# Patient Record
Sex: FEMALE | Race: WHITE | NOT HISPANIC OR LATINO | Employment: OTHER | URBAN - METROPOLITAN AREA
[De-identification: names, ages, dates, MRNs, and addresses within clinical notes are randomized per-mention and may not be internally consistent; named-entity substitution may affect disease eponyms.]

---

## 2019-07-28 ENCOUNTER — APPOINTMENT (OUTPATIENT)
Dept: ULTRASOUND IMAGING | Facility: CLINIC | Age: 54
End: 2019-07-28
Attending: EMERGENCY MEDICINE
Payer: COMMERCIAL

## 2019-07-28 ENCOUNTER — APPOINTMENT (OUTPATIENT)
Dept: CT IMAGING | Facility: CLINIC | Age: 54
End: 2019-07-28
Attending: EMERGENCY MEDICINE
Payer: COMMERCIAL

## 2019-07-28 ENCOUNTER — HOSPITAL ENCOUNTER (EMERGENCY)
Facility: CLINIC | Age: 54
Discharge: HOME | End: 2019-07-28
Attending: EMERGENCY MEDICINE | Admitting: EMERGENCY MEDICINE
Payer: COMMERCIAL

## 2019-07-28 VITALS
OXYGEN SATURATION: 99 % | RESPIRATION RATE: 16 BRPM | SYSTOLIC BLOOD PRESSURE: 111 MMHG | TEMPERATURE: 97.9 F | HEIGHT: 65 IN | HEART RATE: 55 BPM | WEIGHT: 157 LBS | DIASTOLIC BLOOD PRESSURE: 73 MMHG | BODY MASS INDEX: 26.16 KG/M2

## 2019-07-28 DIAGNOSIS — R10.9 RIGHT SIDED ABDOMINAL PAIN: ICD-10-CM

## 2019-07-28 DIAGNOSIS — Z87.42 HISTORY OF ENDOMETRIOSIS: ICD-10-CM

## 2019-07-28 LAB
ALBUMIN SERPL-MCNC: 3.6 G/DL (ref 3.4–5)
ALBUMIN UR-MCNC: NEGATIVE MG/DL
ALP SERPL-CCNC: 64 U/L (ref 40–150)
ALT SERPL W P-5'-P-CCNC: 17 U/L (ref 0–50)
ANION GAP SERPL CALCULATED.3IONS-SCNC: 4 MMOL/L (ref 3–14)
APPEARANCE UR: CLEAR
AST SERPL W P-5'-P-CCNC: 18 U/L (ref 0–45)
BACTERIA #/AREA URNS HPF: ABNORMAL /HPF
BASOPHILS # BLD AUTO: 0 10E9/L (ref 0–0.2)
BASOPHILS NFR BLD AUTO: 0.5 %
BILIRUB SERPL-MCNC: 0.5 MG/DL (ref 0.2–1.3)
BILIRUB UR QL STRIP: NEGATIVE
BUN SERPL-MCNC: 15 MG/DL (ref 7–30)
CALCIUM SERPL-MCNC: 8.8 MG/DL (ref 8.5–10.1)
CHLORIDE SERPL-SCNC: 108 MMOL/L (ref 94–109)
CO2 SERPL-SCNC: 28 MMOL/L (ref 20–32)
COLOR UR AUTO: ABNORMAL
CREAT SERPL-MCNC: 0.78 MG/DL (ref 0.52–1.04)
DIFFERENTIAL METHOD BLD: NORMAL
EOSINOPHIL # BLD AUTO: 0.1 10E9/L (ref 0–0.7)
EOSINOPHIL NFR BLD AUTO: 2.1 %
ERYTHROCYTE [DISTWIDTH] IN BLOOD BY AUTOMATED COUNT: 12.7 % (ref 10–15)
GFR SERPL CREATININE-BSD FRML MDRD: 87 ML/MIN/{1.73_M2}
GLUCOSE SERPL-MCNC: 86 MG/DL (ref 70–99)
GLUCOSE UR STRIP-MCNC: NEGATIVE MG/DL
HCT VFR BLD AUTO: 38.1 % (ref 35–47)
HGB BLD-MCNC: 13.1 G/DL (ref 11.7–15.7)
HGB UR QL STRIP: NEGATIVE
IMM GRANULOCYTES # BLD: 0 10E9/L (ref 0–0.4)
IMM GRANULOCYTES NFR BLD: 0 %
KETONES UR STRIP-MCNC: NEGATIVE MG/DL
LEUKOCYTE ESTERASE UR QL STRIP: NEGATIVE
LIPASE SERPL-CCNC: 150 U/L (ref 73–393)
LYMPHOCYTES # BLD AUTO: 1.5 10E9/L (ref 0.8–5.3)
LYMPHOCYTES NFR BLD AUTO: 36.4 %
MCH RBC QN AUTO: 29.5 PG (ref 26.5–33)
MCHC RBC AUTO-ENTMCNC: 34.4 G/DL (ref 31.5–36.5)
MCV RBC AUTO: 86 FL (ref 78–100)
MONOCYTES # BLD AUTO: 0.3 10E9/L (ref 0–1.3)
MONOCYTES NFR BLD AUTO: 6.2 %
MUCOUS THREADS #/AREA URNS LPF: PRESENT /LPF
NEUTROPHILS # BLD AUTO: 2.3 10E9/L (ref 1.6–8.3)
NEUTROPHILS NFR BLD AUTO: 54.8 %
NITRATE UR QL: NEGATIVE
NRBC # BLD AUTO: 0 10*3/UL
NRBC BLD AUTO-RTO: 0 /100
PH UR STRIP: 6.5 PH (ref 5–7)
PLATELET # BLD AUTO: 203 10E9/L (ref 150–450)
POTASSIUM SERPL-SCNC: 3.7 MMOL/L (ref 3.4–5.3)
PROT SERPL-MCNC: 7 G/DL (ref 6.8–8.8)
RBC # BLD AUTO: 4.44 10E12/L (ref 3.8–5.2)
RBC #/AREA URNS AUTO: <1 /HPF (ref 0–2)
SODIUM SERPL-SCNC: 140 MMOL/L (ref 133–144)
SOURCE: ABNORMAL
SP GR UR STRIP: 1 (ref 1–1.03)
SQUAMOUS #/AREA URNS AUTO: <1 /HPF (ref 0–1)
UROBILINOGEN UR STRIP-MCNC: NORMAL MG/DL (ref 0–2)
WBC # BLD AUTO: 4.2 10E9/L (ref 4–11)
WBC #/AREA URNS AUTO: 0 /HPF (ref 0–5)

## 2019-07-28 PROCEDURE — 25000128 H RX IP 250 OP 636: Performed by: EMERGENCY MEDICINE

## 2019-07-28 PROCEDURE — 76705 ECHO EXAM OF ABDOMEN: CPT

## 2019-07-28 PROCEDURE — 83690 ASSAY OF LIPASE: CPT | Performed by: EMERGENCY MEDICINE

## 2019-07-28 PROCEDURE — 25000125 ZZHC RX 250: Performed by: EMERGENCY MEDICINE

## 2019-07-28 PROCEDURE — 99285 EMERGENCY DEPT VISIT HI MDM: CPT | Mod: 25

## 2019-07-28 PROCEDURE — 96374 THER/PROPH/DIAG INJ IV PUSH: CPT

## 2019-07-28 PROCEDURE — 25500064 ZZH RX 255 OP 636: Performed by: EMERGENCY MEDICINE

## 2019-07-28 PROCEDURE — 85025 COMPLETE CBC W/AUTO DIFF WBC: CPT | Performed by: EMERGENCY MEDICINE

## 2019-07-28 PROCEDURE — 74177 CT ABD & PELVIS W/CONTRAST: CPT

## 2019-07-28 PROCEDURE — 80053 COMPREHEN METABOLIC PANEL: CPT | Performed by: EMERGENCY MEDICINE

## 2019-07-28 PROCEDURE — 81001 URINALYSIS AUTO W/SCOPE: CPT | Performed by: EMERGENCY MEDICINE

## 2019-07-28 RX ORDER — HYDROMORPHONE HYDROCHLORIDE 1 MG/ML
0.5 INJECTION, SOLUTION INTRAMUSCULAR; INTRAVENOUS; SUBCUTANEOUS
Status: DISCONTINUED | OUTPATIENT
Start: 2019-07-28 | End: 2019-07-28 | Stop reason: HOSPADM

## 2019-07-28 RX ADMIN — SODIUM CHLORIDE 64 ML: 9 INJECTION, SOLUTION INTRAVENOUS at 09:26

## 2019-07-28 RX ADMIN — HYDROMORPHONE HYDROCHLORIDE 0.5 MG: 1 INJECTION, SOLUTION INTRAMUSCULAR; INTRAVENOUS; SUBCUTANEOUS at 09:11

## 2019-07-28 RX ADMIN — IOHEXOL 79 ML: 350 INJECTION, SOLUTION INTRAVENOUS at 09:25

## 2019-07-28 ASSESSMENT — MIFFLIN-ST. JEOR: SCORE: 1323.03

## 2019-07-28 NOTE — ED TRIAGE NOTES
Patient reports right upper abdominal pain for 3 days. States first day had severe, lower right sided back pain. Complains of nausea and dizziness. Denies N/V/D and fevers. Patient states intermittent severity of pain.

## 2019-07-28 NOTE — ED AVS SNAPSHOT
Emergency Department  [address] [place] [id number]  Phone:  [phone]  Fax:  [phone]                                    Charmaine Webb   MRN: [id number]    Department:   Emergency Department   Date of Visit:  7/28/2019           After Visit Summary Signature Page    I have received my discharge instructions, and my questions have been answered. I have discussed any challenges I see with this plan with the nurse or doctor.    ..........................................................................................................................................  Patient/Patient Representative Signature      ..........................................................................................................................................  Patient Representative Print Name and Relationship to Patient    ..................................................               ................................................  Date                                   Time    ..........................................................................................................................................  Reviewed by Signature/Title    ...................................................              ..............................................  Date                                               Time          22EPIC Rev 08/18

## 2019-07-28 NOTE — ED PROVIDER NOTES
"  History   Chief Complaint:  Abdominal Pain    HPI   Charmaine Webb is a 54 year old female  with a history of chronic abdominal pain attributed to endometriosis proven by exploratory surgery presents emergency department with a new and different type of right-sided abdominal pain that started 3 to 4 days ago.  It is often provoked by eating, starting about 10 minutes after eating and she describes as a sharp \"spasm\" primarily in the right upper part of her abdomen, though it radiates downward toward the right lower quadrant.  She also has a more constant and new right lower quadrant discomfort.  No urinary symptoms.  She is previous he had a hysterectomy as well as a right-sided oophorectomy for torsion and advanced endometriosis.  No fevers, vomiting, or diarrhea.  No abdominal trauma.  She is not on blood thinners.  She has never been told she had gallbladder problems.  She is here because she is specifically worried about the possibility of gallbladder or appendix problems.    Allergies:  Codeine    Medications:    The patient is not currently taking any prescribed medications.    Past Medical History:    Seasonal Allergies  Hernia    Past Surgical History:     section  Hysterectomy  Hernia repair    Family History:    History reviewed. No pertinent family history.     Social History:  Smoking status: No  Alcohol use: Yes  Drug use: No    Review of Systems   All other systems reviewed and are negative.      Physical Exam     Patient Vitals for the past 24 hrs:   BP Temp Temp src Pulse Resp SpO2 Height Weight   19 0940 106/71 -- -- 56 -- -- -- --   19 0910 106/74 -- -- 58 -- -- -- --   19 0727 127/55 97.9  F (36.6  C) Temporal 67 16 98 % 1.651 m (5' 5\") 71.2 kg (157 lb)     Physical Exam  General: Nontoxic-appearing woman sitting upright in room 19,  at bedside  HENT: mucous membranes moist   CV: regular rate, regular rhythm, no LE edema  Resp: clear " throughout, normal effort, no crackles or wheezing  GI: abdomen soft, moderate tenderness in both right upper and right lower quadrants without guarding, negative Grijalva's, no discrete masses palpable  MSK: no bony tenderness, no CVAT  Skin: appropriately warm and dry, no abdominal rash  Neuro: alert, clear speech, oriented   Psych: pleasant, cooperative    Emergency Department Course   Imaging:  Radiographic findings were communicated with the patient who voiced understanding of the findings.    US Abdomen limited:  Unremarkable right upper quadrant ultrasound.    Imaging independently reviewed and agree with radiologist interpretation.      CT Abd/pelvis with contrast:  1. Moderate formed stool identified in the colon without obstruction  or inflammatory changes.  2. No solid organ abnormalities.    Imaging independently reviewed and agree with radiologist interpretation.     Laboratory:  CBC: WNL (WBC 4.2, HGB 13.1, )    CMP: (Creatinine 0.78)    Lipase: 150    UA: Bacteria Few, Mucous Present, o/w Negative    Interventions:  0911: Dilaudid 0.5 mg IV    Emergency Department Course:  Past medical records, nursing notes, and vitals reviewed.  0745: I performed an exam of the patient and obtained history, as documented above.  IV inserted and blood drawn.    I performed electronic chart review in [employer].    The patient was sent for a abdomen US while in the emergency department, findings above.    0915: I rechecked the patient. Explained findings to patient. The patient agrees to have further imaging done.    1036: I rechecked the patient. Explained findings to patient. Patient is feeling improved. Sleeping soundly.    Findings and plan explained to the Patient. Patient discharged home with instructions regarding supportive care, medications, and reasons to return. The importance of close follow-up was reviewed.     Impression & Plan    Medical Decision Making:  Differential diagnosis included biliary colic,  hepatitis, pancreatitis, gastritis, appendicitis, ureteral stone, and many others.  She is a history of endometriosis causing some chronic abdominal problems, though has not had symptoms like this in the past.  But test results as well as her limitations were reviewed with the patient.  She and her  are very comfortable with the plan for discharge home with close outpatient follow-up.  We discussed possible constipation seen on imaging, though she states this is not been an issue as she had a bowel movement yesterday, and did not wish to initiate any bowel regimen.  Return precautions reviewed and close outpatient follow-up was advised.  She was discharged home in improved condition.    Diagnosis:    ICD-10-CM    1. Right sided abdominal pain R10.9    2. History of endometriosis Z87.42      Disposition:  Discharged to home.    Discharge Medications:     Medication List      Started    omeprazole 20 MG DR capsule  Commonly known as:  priLOSEC  20 mg, Oral, DAILY          Abdi Mari  7/28/2019    EMERGENCY DEPARTMENT  Scribe Disclosure:  IAbdi, am serving as a scribe at 7:45 AM on 7/28/2019 to document services personally performed by Michele Romero MD based on my observations and the provider's statements to me.    This record was created at least in part using electronic voice recognition software, so please excuse any typographical errors.       Michele Romero MD  07/28/19 8453

## 2020-12-09 ENCOUNTER — HOSPITAL ENCOUNTER (OUTPATIENT)
Dept: MAMMOGRAPHY | Facility: CLINIC | Age: 55
End: 2020-12-09
Attending: OBSTETRICS & GYNECOLOGY
Payer: COMMERCIAL

## 2020-12-09 DIAGNOSIS — N64.4 PAIN OF RIGHT BREAST: ICD-10-CM

## 2020-12-09 PROCEDURE — G0279 TOMOSYNTHESIS, MAMMO: HCPCS

## 2020-12-09 PROCEDURE — 76642 ULTRASOUND BREAST LIMITED: CPT | Mod: RT

## 2021-01-15 ENCOUNTER — HEALTH MAINTENANCE LETTER (OUTPATIENT)
Age: 56
End: 2021-01-15

## 2021-03-30 ENCOUNTER — VIRTUAL VISIT (OUTPATIENT)
Dept: FAMILY MEDICINE | Facility: CLINIC | Age: 56
End: 2021-03-30
Payer: COMMERCIAL

## 2021-03-30 DIAGNOSIS — Z12.11 ENCOUNTER FOR SCREENING FOR MALIGNANT NEOPLASM OF COLON: ICD-10-CM

## 2021-03-30 DIAGNOSIS — M70.61 TROCHANTERIC BURSITIS OF RIGHT HIP: ICD-10-CM

## 2021-03-30 DIAGNOSIS — Z90.710 HISTORY OF HYSTERECTOMY: ICD-10-CM

## 2021-03-30 DIAGNOSIS — K43.2 INCISIONAL HERNIA, WITHOUT OBSTRUCTION OR GANGRENE: ICD-10-CM

## 2021-03-30 DIAGNOSIS — J30.2 SEASONAL ALLERGIES: ICD-10-CM

## 2021-03-30 DIAGNOSIS — M19.029 ELBOW ARTHRITIS: ICD-10-CM

## 2021-03-30 DIAGNOSIS — N80.9 ENDOMETRIOSIS: ICD-10-CM

## 2021-03-30 DIAGNOSIS — M35.9 UNDIFFERENTIATED CONNECTIVE TISSUE DISEASE (H): Primary | ICD-10-CM

## 2021-03-30 DIAGNOSIS — M11.00: ICD-10-CM

## 2021-03-30 DIAGNOSIS — J45.20 MILD INTERMITTENT ASTHMA WITHOUT COMPLICATION: ICD-10-CM

## 2021-03-30 PROCEDURE — 99204 OFFICE O/P NEW MOD 45 MIN: CPT | Mod: TEL | Performed by: INTERNAL MEDICINE

## 2021-03-30 RX ORDER — HYDROXYCHLOROQUINE SULFATE 200 MG/1
TABLET, FILM COATED ORAL
COMMUNITY
Start: 2020-11-02 | End: 2021-03-30

## 2021-03-30 RX ORDER — ALBUTEROL SULFATE 90 UG/1
AEROSOL, METERED RESPIRATORY (INHALATION)
COMMUNITY
Start: 2020-11-01 | End: 2024-01-02

## 2021-03-30 RX ORDER — CONJUGATED ESTROGENS 0.62 MG/1
0.62 TABLET, FILM COATED ORAL DAILY
COMMUNITY
Start: 2021-03-04

## 2021-03-30 NOTE — PATIENT INSTRUCTIONS
As discussed regarding your medical conditions pertinent to Rheumatology and the work up done recently will get all the records for my review before further recommendations.      ===========================    [email or web address]?utm_source=[email or web address]&utm_medium=social_organic&utm_campaign=mhf_comms_vaccine_update_65_over&fbclid=[id number]       ===========================      We are working hard to begin vaccinating more people against COVID-19. Currently, we are vaccinating:    Individuals age 65 and older    Phase 1a healthcare workers, both paid and unpaid, who are unable to do their job remotely.     People 16 and older with rare conditions or disabilities that put them at higher risk listed in Phase 1b tier 2.    People age 45-64 years with one or more underlying medical conditions listed in Phase 1b tier 3.    People age 16 or 18-44 years with two or more underlying medical conditions listed in Phase 1b tier 3.    People age 50 years and older in multigenerational housing (three or more generations living in the household)     If you fit into one of these categories, please log in to [employer] using this link to see if we have an open appointment and schedule an appointment.  Most new appointments are released on Tuesdays at 8 a.m. This is when appointment availability is best. Additional appointments may open up during the week as appointments are canceled or we receive additional vaccine.    If there are no appointments left, you will be unable to schedule.   If you have technical difficulty using [employer], call [phone] for assistance.      You can learn more about the [facility]'s phased approach to administering the vaccine, with details on each phase,?Https://www.health.[email or web address].[email or web address]/diseases/coronavirus/vaccine/plan.     As vaccine supply increases and we are able to open appointments to more groups, we will share that  information on our website:  [email or web address]. Check this website to stay up to date on COVID-19 vaccination information.         ===========================    [phone] - Can try calling this number, does have long wait time.     [place]     [place]://[place]/vaccine/find-vaccine/index.jsp?fbclid=[id number]

## 2021-03-30 NOTE — PROGRESS NOTES
Charmaine is a 55 year old who is being evaluated via a billable telephone visit.      What phone number would you like to be contacted at? [phone]  [facility]    Assessment and Plan  1. Undifferentiated connective tissue disease (H)  2. Hydroxyapatite deposition disease  Ongoing medical condition, Started initially 2 yrs back. Not on Plaquinil as pt states that she never took it though in the medication list. Blood work demonstarted Lupus? - and working diagnosis of Undifferentiated Connective tissue disease. NELSON,RNP showed up POSITIVE.  Last lab work in 12/2020 and 3/12/2021.  She does take Benadryl and Ibuprofen for flare ups and follows [facility] for specific joint issues. Rheumatologist - [facility] [place]. Dr. Huong Wallis.  Saw him last on 3/12/2021 with all work up done as per the patient. Will get records after pt signs LASHAY with Inclinic visit.Further labs will be considered after I review the records.     3. Trochanteric bursitis of right hip  4. Elbow arthritis  Stable, Rt trochanteric bursitis and resolved after Cortisone shots.After 6 months Elbow joint was involved and following Orthopedics , [facility] .Will get records after pt signs LASHAY with Inclinic visit.     5. Mild intermittent asthma without complication  6. Seasonal allergies  Well controlled, rarely needing Albuterol PRN. AAP and ACT discussed.     7. Encounter for screening for malignant neoplasm of colon  - Fecal colorectal cancer screen (FIT); Future    8. Endometriosis  9. History of hysterectomy  10. Incisional hernia, without obstruction or gangrene  Pt had endometriosis S/P Hysterectomy and oophorectomy 2011 and 2014. Hernia mesh in rectus 2015. Left Ovary is adhered to Sigmoid colon and also endometriosis patches on most of the intestines and pt is hesitant for COlonoscopy . Does have on and off abdominal pains in the past but stable since 1 year. Will get the  records from OBGYN, once pt does LASHAY after Inclinic visit.        Patient Instructions   As discussed regarding your medical conditions pertinent to Rheumatology and the work up done recently will get all the records for my review before further recommendations.      ===========================    [email or web address]?utm_source=[employer]&utm_medium=social_organic&utm_campaign=mhf_comms_vaccine_update_65_over&fbclid=[id number]       ===========================      We are working hard to begin vaccinating more people against COVID-19. Currently, we are vaccinating:    Individuals age 65 and older    Phase 1a healthcare workers, both paid and unpaid, who are unable to do their job remotely.     People 16 and older with rare conditions or disabilities that put them at higher risk listed in Phase 1b tier 2.    People age 45-64 years with one or more underlying medical conditions listed in Phase 1b tier 3.    People age 16 or 18-44 years with two or more underlying medical conditions listed in Phase 1b tier 3.    People age 50 years and older in multigenerational housing (three or more generations living in the household)     If you fit into one of these categories, please log in to [employer] using this link to see if we have an open appointment and schedule an appointment.  Most new appointments are released on Tuesdays at 8 a.m. This is when appointment availability is best. Additional appointments may open up during the week as appointments are canceled or we receive additional vaccine.    If there are no appointments left, you will be unable to schedule.   If you have technical difficulty using [employer], call [phone] for assistance.      You can learn more about the [facility]'s phased approach to administering the vaccine, with details on each phase,?Https://www.health.[facility].[place]/diseases/coronavirus/vaccine/plan.     As vaccine supply  increases and we are able to open appointments to more groups, we will share that information on our website:  [email or web address]. Check this website to stay up to date on COVID-19 vaccination information.         ===========================    [phone] - Can try calling this number, does have long wait time.     [place]     https://[place].gov/[place]id19/vaccine/find-vaccine/index.jsp?fbclid=[id number]     Return in about 1 month (around 4/30/2021), or if symptoms worsen or fail to improve, for Follow up of last visit.    Crystal Lewis MD  [facility] [place] is a 55 year old who presents for the following health issues       HPI      New Patient/Transfer of Care      Seeing this pt first time today, new to . Last seen at [facility]. No other records seen.Pt has moved recently and was last seen by PCP at [place].   - Never had Colonoscopy and do not want to have one given the endometriosis condition on her viscera which she states, no records available yet.       Allergies   Allergen Reactions     Codeine      States has night terrors     No Clinical Screening - See Comments      Ayana        Past Medical History:   Diagnosis Date     Seasonal allergies        Past Surgical History:   Procedure Laterality Date     GYN SURGERY      c-sections,hysterectomy     HERNIA REPAIR         History reviewed. No pertinent family history.    Social History     Tobacco Use     Smoking status: Not on file   Substance Use Topics     Alcohol use: Not on file        Current Outpatient Medications   Medication     PREMARIN 0.625 MG tablet     albuterol (PROAIR HFA/PROVENTIL HFA/VENTOLIN HFA) 108 (90 Base) MCG/ACT inhaler     No current facility-administered medications for this visit.         Review of Systems   Constitutional, HEENT,  cardiovascular, pulmonary, GI, , musculoskeletal, neuro, skin, endocrine and psych systems are negative, except as otherwise noted.      Objective           Vitals:  No vitals were obtained today due to virtual visit.    Physical Exam   healthy, alert and no distress  PSYCH: Alert and oriented times 3; coherent speech, normal   rate and volume, able to articulate logical thoughts, able   to abstract reason, no tangential thoughts, no hallucinations   or delusions  Her affect is normal  RESP: No cough, no audible wheezing, able to talk in full sentences  Remainder of exam unable to be completed due to telephone visits    Labs and imaging reviewed and discussed with the patient.    Phone call duration: 21 minutes

## 2021-03-30 NOTE — LETTER
My Asthma Action Plan    Name: Charmaine Webb   YOB: 1965  Date: 3/30/2021   My doctor: Crystal Lewis MD   My clinic: [facility] [place]        My Rescue Medicine:   Albuterol inhaler (Proair/Ventolin/Proventil HFA)  2-4 puffs EVERY 4 HOURS as needed. Use a spacer if recommended by your provider.   My Asthma Severity:   Intermittent / Exercise Induced  Know your asthma triggers: N/A             GREEN ZONE   Good Control    I feel good    No cough or wheeze    Can work, sleep and play without asthma symptoms       Take your asthma control medicine every day.     1. If exercise triggers your asthma, take your rescue medication    15 minutes before exercise or sports, and    During exercise if you have asthma symptoms  2. Spacer to use with inhaler: If you have a spacer, make sure to use it with your inhaler             YELLOW ZONE Getting Worse  I have ANY of these:    I do not feel good    Cough or wheeze    Chest feels tight    Wake up at night   1. Keep taking your Green Zone medications  2. Start taking your rescue medicine:    every 20 minutes for up to 1 hour. Then every 4 hours for 24-48 hours.  3. If you stay in the Yellow Zone for more than 12-24 hours, contact your doctor.  4. If you do not return to the Green Zone in 12-24 hours or you get worse, start taking your oral steroid medicine if prescribed by your provider.           RED ZONE Medical Alert - Get Help  I have ANY of these:    I feel awful    Medicine is not helping    Breathing getting harder    Trouble walking or talking    Nose opens wide to breathe       1. Take your rescue medicine NOW  2. If your provider has prescribed an oral steroid medicine, start taking it NOW  3. Call your doctor NOW  4. If you are still in the Red Zone after 20 minutes and you have not reached your doctor:    Take your rescue medicine again and    Call 911 or go to the emergency room right away    See your regular doctor  within 2 weeks of an Emergency Room or Urgent Care visit for follow-up treatment.          Annual Reminders:  Meet with Asthma Educator,  Flu Shot in the Fall, consider Pneumonia Vaccination for patients with asthma (aged 19 and older).    Pharmacy: [id number]  [place], [place] - [address]    Electronically signed by Crystal Lewis MD   Date: 03/30/21                    Asthma Triggers  How To Control Things That Make Your Asthma Worse    Triggers are things that make your asthma worse.  Look at the list below to help you find your triggers and   what you can do about them. You can help prevent asthma flare-ups by staying away from your triggers.      Trigger                                                          What you can do   Cigarette Smoke  Tobacco smoke can make asthma worse. Do not allow smoking in your home, car or around you.  Be sure no one smokes at a child s day care or school.  If you smoke, ask your health care provider for ways to help you quit.  Ask family members to quit too.  Ask your health care provider for a referral to Quit Plan to help you quit smoking, or call 8-029-435-PLAN.     Colds, Flu, Bronchitis  These are common triggers of asthma. Wash your hands often.  Don t touch your eyes, nose or mouth.  Get a flu shot every year.     Dust Mites  These are tiny bugs that live in cloth or carpet. They are too small to see. Wash sheets and blankets in hot water every week.   Encase pillows and mattress in dust mite proof covers.  Avoid having carpet if you can. If you have carpet, vacuum weekly.   Use a dust mask and HEPA vacuum.   Pollen and Outdoor Mold  Some people are allergic to trees, grass, or weed pollen, or molds. Try to keep your windows closed.  Limit time out doors when pollen count is high.   Ask you health care provider about taking medicine during allergy season.     Animal Dander  Some people are allergic to skin flakes, urine or saliva from pets with fur  or feathers. Keep pets with fur or feathers out of your home.    If you can t keep the pet outdoors, then keep the pet out of your bedroom.  Keep the bedroom door closed.  Keep pets off cloth furniture and away from stuffed toys.     Mice, Rats, and Cockroaches  Some people are allergic to the waste from these pests.   Cover food and garbage.  Clean up spills and food crumbs.  Store grease in the refrigerator.   Keep food out of the bedroom.   Indoor Mold  This can be a trigger if your home has high moisture. Fix leaking faucets, pipes, or other sources of water.   Clean moldy surfaces.  Dehumidify basement if it is damp and smelly.   Smoke, Strong Odors, and Sprays  These can reduce air quality. Stay away from strong odors and sprays, such as perfume, powder, hair spray, paints, smoke incense, paint, cleaning products, candles and new carpet.   Exercise or Sports  Some people with asthma have this trigger. Be active!  Ask your doctor about taking medicine before sports or exercise to prevent symptoms.    Warm up for 5-10 minutes before and after sports or exercise.     Other Triggers of Asthma  Cold air:  Cover your nose and mouth with a scarf.  Sometimes laughing or crying can be a trigger.  Some medicines and food can trigger asthma.

## 2021-03-31 ASSESSMENT — ASTHMA QUESTIONNAIRES: ACT_TOTALSCORE: 25

## 2021-06-17 ENCOUNTER — HOSPITAL ENCOUNTER (EMERGENCY)
Facility: CLINIC | Age: 56
Discharge: HOME | End: 2021-06-17
Attending: EMERGENCY MEDICINE | Admitting: EMERGENCY MEDICINE
Payer: COMMERCIAL

## 2021-06-17 ENCOUNTER — APPOINTMENT (OUTPATIENT)
Dept: GENERAL RADIOLOGY | Facility: CLINIC | Age: 56
End: 2021-06-17
Attending: EMERGENCY MEDICINE
Payer: COMMERCIAL

## 2021-06-17 VITALS
TEMPERATURE: 97.6 F | HEIGHT: 65 IN | BODY MASS INDEX: 26.66 KG/M2 | OXYGEN SATURATION: 100 % | SYSTOLIC BLOOD PRESSURE: 137 MMHG | HEART RATE: 61 BPM | DIASTOLIC BLOOD PRESSURE: 80 MMHG | WEIGHT: 160 LBS | RESPIRATION RATE: 16 BRPM

## 2021-06-17 DIAGNOSIS — S92.534B OPEN NONDISPLACED FRACTURE OF DISTAL PHALANX OF LESSER TOE OF RIGHT FOOT, INITIAL ENCOUNTER: ICD-10-CM

## 2021-06-17 PROCEDURE — 250N000011 HC RX IP 250 OP 636: Performed by: EMERGENCY MEDICINE

## 2021-06-17 PROCEDURE — 12002 RPR S/N/AX/GEN/TRNK2.6-7.5CM: CPT

## 2021-06-17 PROCEDURE — 90471 IMMUNIZATION ADMIN: CPT | Performed by: EMERGENCY MEDICINE

## 2021-06-17 PROCEDURE — 73660 X-RAY EXAM OF TOE(S): CPT | Mod: RT

## 2021-06-17 PROCEDURE — 250N000013 HC RX MED GY IP 250 OP 250 PS 637: Performed by: EMERGENCY MEDICINE

## 2021-06-17 PROCEDURE — 99284 EMERGENCY DEPT VISIT MOD MDM: CPT | Mod: 25

## 2021-06-17 PROCEDURE — 90715 TDAP VACCINE 7 YRS/> IM: CPT | Performed by: EMERGENCY MEDICINE

## 2021-06-17 RX ORDER — HYDROCODONE BITARTRATE AND ACETAMINOPHEN 5; 325 MG/1; MG/1
1 TABLET ORAL EVERY 6 HOURS PRN
Qty: 12 TABLET | Refills: 0 | Status: SHIPPED | OUTPATIENT
Start: 2021-06-17 | End: 2024-01-02

## 2021-06-17 RX ORDER — CEPHALEXIN 500 MG/1
1000 CAPSULE ORAL ONCE
Status: COMPLETED | OUTPATIENT
Start: 2021-06-17 | End: 2021-06-17

## 2021-06-17 RX ORDER — CEPHALEXIN 500 MG/1
500 CAPSULE ORAL 3 TIMES DAILY
Qty: 21 CAPSULE | Refills: 0 | Status: SHIPPED | OUTPATIENT
Start: 2021-06-17 | End: 2021-06-24

## 2021-06-17 RX ADMIN — CEPHALEXIN 1000 MG: 500 CAPSULE ORAL at 11:32

## 2021-06-17 RX ADMIN — CLOSTRIDIUM TETANI TOXOID ANTIGEN (FORMALDEHYDE INACTIVATED), CORYNEBACTERIUM DIPHTHERIAE TOXOID ANTIGEN (FORMALDEHYDE INACTIVATED), BORDETELLA PERTUSSIS TOXOID ANTIGEN (GLUTARALDEHYDE INACTIVATED), BORDETELLA PERTUSSIS FILAMENTOUS HEMAGGLUTININ ANTIGEN (FORMALDEHYDE INACTIVATED), BORDETELLA PERTUSSIS PERTACTIN ANTIGEN, AND BORDETELLA PERTUSSIS FIMBRIAE 2/3 ANTIGEN 0.5 ML: 5; 2; 2.5; 5; 3; 5 INJECTION, SUSPENSION INTRAMUSCULAR at 09:52

## 2021-06-17 SDOH — HEALTH STABILITY: MENTAL HEALTH: HOW OFTEN DO YOU HAVE A DRINK CONTAINING ALCOHOL?: NEVER

## 2021-06-17 ASSESSMENT — MIFFLIN-ST. JEOR: SCORE: 1326.64

## 2021-06-17 ASSESSMENT — ENCOUNTER SYMPTOMS: WOUND: 1

## 2021-06-17 NOTE — ED PROVIDER NOTES
"  History     Chief Complaint:  Foot injury    The history is provided by the patient.      Charmaine Webb is a 56 year old female who presents with foot injury. She was working out with clubs and was swinging it forward but it hit her foot. There are 3 lacerations to her second and third toes, and pain in the toes as well.  She cleaned the wound at home. Patient is not on blood thinners. Last tetanus shot was in 1974.     Review of Systems   Skin: Positive for wound.   All other systems reviewed and are negative.      Allergies:  Codeine    Medications:    Albuterol     Past Medical History:    Mild intermittent asthma   Hydroxyapatite deposition disease   R Hip trochanteric bursitis   Endometriosis  Undifferentiated CT Disease   Elbow Arthritis   Hernia      Past Surgical History:    C section  Hysterectomy   Hernia Repair   Oophorectomy     Family History:    Patient did not report family history.     Social History:  Patient was accompanied to the ED with .    Physical Exam     Patient Vitals for the past 24 hrs:   BP Temp Temp src Pulse Resp SpO2 Height Weight   06/17/21 0921 137/80 97.6  F (36.4  C) Temporal 61 16 100 % 1.651 m (5' 5\") 72.6 kg (160 lb)       Physical Exam  General: Resting uncomfortably on the gurney  Head:  The scalp, face, and head appear normal  Eyes:  The pupils are equal, round, and reactive to light    There is no nystagmus  ENT:    The nose is normal    Pinnae are normal  Neck:  Normal range of motion    Non-surgical without peritoneal features  MS:  There is normal movement of the patient's toes on the right foot.  No obvious tendon injury is detected.  There is normal sensation.  Skin:  The patient has 1 laceration to the second toe and 2 lacerations to the third toe.  The second toe is a volar 1 cm laceration.  The third toe has a 1.5 cm volar and 1 cm dorsal laceration at the junction between the distal nail plate and the skin.  There are small subungual hematomas to " the 2nd-3rd nailbed neuro: Speech is normal and fluent      Emergency Department Course   Imaging:  XR Toe Right G/E 2 Views  Final Result  Impression:  1.  Acute transverse fractures across the agatha of the distal  phalanges in the second and third toes. Fracture components are  distracted 2 mm, not significantly displaced. No intra-articular  extension, or joint malalignment at the PIP joints. No radiopaque  foreign bodies.  2.  Normal right foot otherwise. No other fracture. Normal joint  alignment and spacing.  TRENTON GIRARD MD  Per Radiology    Procedures:          Laceration Repair:    Performed by: Nigel Fiore MD  Consent given by: Patient who states understanding of the procedure being performed after discussing the risks, benefits and alternatives.    Preparation: Patient was prepped and draped in usual sterile fashion.  Irrigation solution: saline    Body area:2 and 3 toe  Laceration length: 1cm, 1.5 cm, 1 cm  Contamination: The wound is not contaminated.  Foreign bodies:none  Tendon involvement: none  Anesthesia: Local  Local anesthetic: Bupivacaine 0.5%  Anesthetic total: 10ml  Local anesthetic: Lidocaine 1%  Annasthetic 5 mL    Debridement: none  Skin closure: Closed with 5.0 Ethilon  Technique: interrupted  Approximation: close  Approximation difficulty: simple    Patient tolerance: Patient tolerated the procedure well with no immediate complications.    Emergency Department Course:    Reviewed:  I reviewed nursing notes, vitals, past history and care everywhere    Assessments:   I obtained history and examined the patient as noted above.   0927 I rechecked the patient and explained findings.     Interventions:  0952 Tdap Injection - 0.5 mL - IM    Disposition:  The patient was discharged to home.    Impression & Plan    Medical Decision Making:  Patient presents with a toe injury as noted above.  Her second toe and third toe have open fractures involving the distal phalanx.  There were volar  wounds as noted above to both toes.  These were closed with 5 oh simple interrupted nylon sutures.  There was a 1 cm dorsal laceration at the junction between the distal nail plate and the skin which also was oozing blood and required repair.  There was approximately 3.5 cm worth of repairs in 3 different locations.  These represent open fractures.  They were very nicely irrigated under pressure with normal saline prior to repair.  The toes were prepped with a Betadine prep.    Covid-19  Charmaine Webb was evaluated during a global COVID-19 pandemic, which necessitated consideration that the patient might be at risk for infection with the SARS-CoV-2 virus that causes COVID-19.   Applicable protocols for evaluation were followed during the patient's care.     Diagnosis:    ICD-10-CM    1. Open nondisplaced fracture of distal phalanx of lesser toe of right foot, initial encounter  S92.534B        Discharge Medications:  Discharge Medication List as of 6/17/2021 11:48 AM      START taking these medications    Details   cephALEXin (KEFLEX) 500 MG capsule Take 1 capsule (500 mg) by mouth 3 times daily for 7 days, Disp-21 capsule, R-0, Local Print      HYDROcodone-acetaminophen (NORCO) 5-325 MG tablet Take 1 tablet by mouth every 6 hours as needed for moderate pain, Disp-12 tablet, R-0, Local Print               Scribe Disclosure:  I, Fabien Arroyo, am serving as a scribe at 9:32 AM on 6/17/2021 to document services personally performed by Nigel Fiore MD based on my observations and the provider's statements to me.      Nigel Fiore MD  06/17/21 5734

## 2021-06-17 NOTE — DISCHARGE INSTRUCTIONS
Take Norco as needed for pain  Take Keflex 500 mg 3 times a day for 1 week  Advance your activity as able  Use your stiff boot for at least 1 to 2 weeks  Return for redness, warmth, fever, chills, red streaks to the foot  You may see orthopedic surgery if you desire, [facility] orthopedics on [address]  Can remove the dressing in 24 hours, wear a clean sock

## 2021-06-17 NOTE — ED TRIAGE NOTES
pt smashed her right foot and toes with a wooden workout weight a hour ago. pt reports that two toes are bleeding, but pt is not on blood thinners. pt reports that her pain is a 10/10.

## 2021-09-05 ENCOUNTER — HEALTH MAINTENANCE LETTER (OUTPATIENT)
Age: 56
End: 2021-09-05

## 2022-02-20 ENCOUNTER — HEALTH MAINTENANCE LETTER (OUTPATIENT)
Age: 57
End: 2022-02-20

## 2022-07-06 ENCOUNTER — ANCILLARY PROCEDURE (OUTPATIENT)
Dept: CT IMAGING | Facility: CLINIC | Age: 57
End: 2022-07-06
Attending: INTERNAL MEDICINE
Payer: COMMERCIAL

## 2022-07-06 DIAGNOSIS — R10.9 ABDOMINAL PAIN: ICD-10-CM

## 2022-07-06 PROCEDURE — 74177 CT ABD & PELVIS W/CONTRAST: CPT

## 2022-07-06 PROCEDURE — 255N000002 HC RX 255 OP 636: Performed by: INTERNAL MEDICINE

## 2022-07-06 RX ADMIN — IOHEXOL 100 ML: 350 INJECTION, SOLUTION INTRAVENOUS at 08:20

## 2022-10-11 ENCOUNTER — TRANSFERRED RECORDS (OUTPATIENT)
Dept: HEALTH INFORMATION MANAGEMENT | Facility: CLINIC | Age: 57
End: 2022-10-11

## 2022-10-23 ENCOUNTER — HEALTH MAINTENANCE LETTER (OUTPATIENT)
Age: 57
End: 2022-10-23

## 2023-04-02 ENCOUNTER — HEALTH MAINTENANCE LETTER (OUTPATIENT)
Age: 58
End: 2023-04-02

## 2023-08-09 ENCOUNTER — TRANSFERRED RECORDS (OUTPATIENT)
Dept: HEALTH INFORMATION MANAGEMENT | Facility: CLINIC | Age: 58
End: 2023-08-09
Payer: COMMERCIAL

## 2023-08-09 LAB
ALT SERPL-CCNC: 13 IU/L (ref 5–35)
AST SERPL-CCNC: 20 U/L (ref 5–34)
CREATININE (EXTERNAL): 0.74 MG/DL (ref 0.5–1.3)
GFR ESTIMATED (EXTERNAL): 86.3 ML/MIN/1.73M2

## 2023-08-21 ENCOUNTER — TELEPHONE (OUTPATIENT)
Dept: FAMILY MEDICINE | Facility: CLINIC | Age: 58
End: 2023-08-21
Payer: COMMERCIAL

## 2023-08-22 NOTE — TELEPHONE ENCOUNTER
Not seen pt after 3/2021. Received Rheumatology records.     Please call the patient and schedule Annual physical with me, which patient is due at this time, to further take care of the medical conditions and medications.OK to use same day slot / double book near another virtual slot in 2 weeks.     Thank you,  Crystal Lewis MD on 8/21/2023

## 2023-12-06 ENCOUNTER — MYC REFILL (OUTPATIENT)
Dept: FAMILY MEDICINE | Facility: CLINIC | Age: 58
End: 2023-12-06
Payer: COMMERCIAL

## 2023-12-07 RX ORDER — ALBUTEROL SULFATE 90 UG/1
AEROSOL, METERED RESPIRATORY (INHALATION)
Qty: 18 G | OUTPATIENT
Start: 2023-12-07

## 2023-12-08 NOTE — TELEPHONE ENCOUNTER
Not seen pt since 3/2021. Pt not responding to scheduling messages.  Please use same day slot in my schedule if patient calls back.        Self

## 2023-12-11 NOTE — TELEPHONE ENCOUNTER
Informed patient of the message from provider. Patient scheduled for physical on 01/02/24 at 9:10pm. Michelle Grover CMA      12/11/2023     5:48 PM

## 2024-01-02 ENCOUNTER — OFFICE VISIT (OUTPATIENT)
Dept: FAMILY MEDICINE | Facility: CLINIC | Age: 59
End: 2024-01-02
Payer: COMMERCIAL

## 2024-01-02 VITALS
RESPIRATION RATE: 16 BRPM | SYSTOLIC BLOOD PRESSURE: 122 MMHG | DIASTOLIC BLOOD PRESSURE: 80 MMHG | HEART RATE: 55 BPM | OXYGEN SATURATION: 99 % | TEMPERATURE: 97.8 F | BODY MASS INDEX: 26.89 KG/M2 | WEIGHT: 161.4 LBS | HEIGHT: 65 IN

## 2024-01-02 DIAGNOSIS — Z91.018 MULTIPLE FOOD ALLERGIES: ICD-10-CM

## 2024-01-02 DIAGNOSIS — Z00.00 ROUTINE GENERAL MEDICAL EXAMINATION AT A HEALTH CARE FACILITY: Primary | ICD-10-CM

## 2024-01-02 DIAGNOSIS — Z12.31 VISIT FOR SCREENING MAMMOGRAM: ICD-10-CM

## 2024-01-02 DIAGNOSIS — Z11.4 SCREENING FOR HIV (HUMAN IMMUNODEFICIENCY VIRUS): ICD-10-CM

## 2024-01-02 DIAGNOSIS — M35.9 CONNECTIVE TISSUE DISEASE, UNDIFFERENTIATED (H): ICD-10-CM

## 2024-01-02 DIAGNOSIS — J30.2 SEASONAL ALLERGIES: ICD-10-CM

## 2024-01-02 DIAGNOSIS — J45.20 MILD INTERMITTENT ASTHMA WITHOUT COMPLICATION: ICD-10-CM

## 2024-01-02 DIAGNOSIS — Z11.59 NEED FOR HEPATITIS C SCREENING TEST: ICD-10-CM

## 2024-01-02 DIAGNOSIS — H00.014 HORDEOLUM EXTERNUM OF LEFT UPPER EYELID: ICD-10-CM

## 2024-01-02 DIAGNOSIS — H10.13 ALLERGIC CONJUNCTIVITIS, BILATERAL: ICD-10-CM

## 2024-01-02 DIAGNOSIS — Z13.220 ENCOUNTER FOR SCREENING FOR LIPID DISORDER: ICD-10-CM

## 2024-01-02 PROBLEM — K57.30 DIVERTICULAR DISEASE OF LARGE INTESTINE: Status: ACTIVE | Noted: 2022-10-11

## 2024-01-02 PROBLEM — R10.31 RIGHT LOWER QUADRANT PAIN: Status: ACTIVE | Noted: 2024-01-02

## 2024-01-02 LAB
ALBUMIN SERPL BCG-MCNC: 4.3 G/DL (ref 3.5–5.2)
ALP SERPL-CCNC: 63 U/L (ref 40–150)
ALT SERPL W P-5'-P-CCNC: 17 U/L (ref 0–50)
ANION GAP SERPL CALCULATED.3IONS-SCNC: 10 MMOL/L (ref 7–15)
AST SERPL W P-5'-P-CCNC: 26 U/L (ref 0–45)
BILIRUB SERPL-MCNC: 0.3 MG/DL
BUN SERPL-MCNC: 14.8 MG/DL (ref 6–20)
CALCIUM SERPL-MCNC: 9.5 MG/DL (ref 8.6–10)
CHLORIDE SERPL-SCNC: 104 MMOL/L (ref 98–107)
CHOLEST SERPL-MCNC: 248 MG/DL
CREAT SERPL-MCNC: 0.93 MG/DL (ref 0.51–0.95)
DEPRECATED HCO3 PLAS-SCNC: 26 MMOL/L (ref 22–29)
EGFRCR SERPLBLD CKD-EPI 2021: 72 ML/MIN/1.73M2
ERYTHROCYTE [DISTWIDTH] IN BLOOD BY AUTOMATED COUNT: 12.7 % (ref 10–15)
FASTING STATUS PATIENT QL REPORTED: YES
GLUCOSE SERPL-MCNC: 86 MG/DL (ref 70–99)
HCT VFR BLD AUTO: 41.2 % (ref 35–47)
HCV AB SERPL QL IA: NONREACTIVE
HDLC SERPL-MCNC: 94 MG/DL
HGB BLD-MCNC: 13.5 G/DL (ref 11.7–15.7)
HIV 1+2 AB+HIV1 P24 AG SERPL QL IA: NONREACTIVE
LDLC SERPL CALC-MCNC: 132 MG/DL
MCH RBC QN AUTO: 28.9 PG (ref 26.5–33)
MCHC RBC AUTO-ENTMCNC: 32.8 G/DL (ref 31.5–36.5)
MCV RBC AUTO: 88 FL (ref 78–100)
NONHDLC SERPL-MCNC: 154 MG/DL
PLATELET # BLD AUTO: 234 10E3/UL (ref 150–450)
POTASSIUM SERPL-SCNC: 3.8 MMOL/L (ref 3.4–5.3)
PROT SERPL-MCNC: 7.2 G/DL (ref 6.4–8.3)
RBC # BLD AUTO: 4.67 10E6/UL (ref 3.8–5.2)
SODIUM SERPL-SCNC: 140 MMOL/L (ref 135–145)
TRIGL SERPL-MCNC: 110 MG/DL
WBC # BLD AUTO: 4.6 10E3/UL (ref 4–11)

## 2024-01-02 PROCEDURE — 36415 COLL VENOUS BLD VENIPUNCTURE: CPT | Performed by: INTERNAL MEDICINE

## 2024-01-02 PROCEDURE — 80053 COMPREHEN METABOLIC PANEL: CPT | Performed by: INTERNAL MEDICINE

## 2024-01-02 PROCEDURE — 85027 COMPLETE CBC AUTOMATED: CPT | Performed by: INTERNAL MEDICINE

## 2024-01-02 PROCEDURE — 80061 LIPID PANEL: CPT | Performed by: INTERNAL MEDICINE

## 2024-01-02 PROCEDURE — 86803 HEPATITIS C AB TEST: CPT | Performed by: INTERNAL MEDICINE

## 2024-01-02 PROCEDURE — 99214 OFFICE O/P EST MOD 30 MIN: CPT | Mod: 25 | Performed by: INTERNAL MEDICINE

## 2024-01-02 PROCEDURE — 99396 PREV VISIT EST AGE 40-64: CPT | Performed by: INTERNAL MEDICINE

## 2024-01-02 PROCEDURE — 87389 HIV-1 AG W/HIV-1&-2 AB AG IA: CPT | Performed by: INTERNAL MEDICINE

## 2024-01-02 RX ORDER — AZELASTINE HYDROCHLORIDE 0.5 MG/ML
1 SOLUTION/ DROPS OPHTHALMIC 2 TIMES DAILY
Qty: 6 ML | Refills: 0 | Status: SHIPPED | OUTPATIENT
Start: 2024-01-02 | End: 2024-05-10

## 2024-01-02 RX ORDER — CETIRIZINE HYDROCHLORIDE 10 MG/1
10 TABLET ORAL DAILY
Qty: 30 TABLET | Refills: 1 | Status: SHIPPED | OUTPATIENT
Start: 2024-01-02

## 2024-01-02 RX ORDER — ALBUTEROL SULFATE 90 UG/1
AEROSOL, METERED RESPIRATORY (INHALATION)
Qty: 18 G | Refills: 1 | Status: SHIPPED | OUTPATIENT
Start: 2024-01-02 | End: 2024-04-23

## 2024-01-02 ASSESSMENT — ENCOUNTER SYMPTOMS
COUGH: 0
NAUSEA: 0
CHILLS: 0
DIZZINESS: 0
SHORTNESS OF BREATH: 0
HEMATURIA: 0
MYALGIAS: 0
EYE PAIN: 1
PARESTHESIAS: 0
JOINT SWELLING: 1
FREQUENCY: 0
NERVOUS/ANXIOUS: 0
HEADACHES: 0
DYSURIA: 0
ABDOMINAL PAIN: 0
CONSTIPATION: 0
ARTHRALGIAS: 1
SORE THROAT: 0
HEMATOCHEZIA: 0
WEAKNESS: 0
DIARRHEA: 0
HEARTBURN: 0
PALPITATIONS: 0
BREAST MASS: 0
FEVER: 0

## 2024-01-02 ASSESSMENT — ASTHMA QUESTIONNAIRES
QUESTION_2 LAST FOUR WEEKS HOW OFTEN HAVE YOU HAD SHORTNESS OF BREATH: ONCE OR TWICE A WEEK
QUESTION_3 LAST FOUR WEEKS HOW OFTEN DID YOUR ASTHMA SYMPTOMS (WHEEZING, COUGHING, SHORTNESS OF BREATH, CHEST TIGHTNESS OR PAIN) WAKE YOU UP AT NIGHT OR EARLIER THAN USUAL IN THE MORNING: NOT AT ALL
QUESTION_4 LAST FOUR WEEKS HOW OFTEN HAVE YOU USED YOUR RESCUE INHALER OR NEBULIZER MEDICATION (SUCH AS ALBUTEROL): ONCE A WEEK OR LESS
QUESTION_5 LAST FOUR WEEKS HOW WOULD YOU RATE YOUR ASTHMA CONTROL: WELL CONTROLLED
ACT_TOTALSCORE: 22
QUESTION_1 LAST FOUR WEEKS HOW MUCH OF THE TIME DID YOUR ASTHMA KEEP YOU FROM GETTING AS MUCH DONE AT WORK, SCHOOL OR AT HOME: NONE OF THE TIME
ACT_TOTALSCORE: 22

## 2024-01-02 ASSESSMENT — PAIN SCALES - GENERAL: PAINLEVEL: NO PAIN (0)

## 2024-01-02 NOTE — PATIENT INSTRUCTIONS
As discussed , please do fasting labs placed  As opted will check your baseline labs once in 6 months given the connective tissue disorder on your list.   Started on eye drops for your conjunctivitis.   Continue Zyrtec.  Placed referral to Allergy specialist    Please think over to approach Rheumatology once you decide to take the treatment plan recommended by them.     ==========================    Preventive Health Recommendations  Female Ages 50 - 64    Yearly exam: See your health care provider every year in order to  Review health changes.   Discuss preventive care.    Review your medicines if your doctor has prescribed any.    Get a Pap test every three years (unless you have an abnormal result and your provider advises testing more often).  If you get Pap tests with HPV test, you only need to test every 5 years, unless you have an abnormal result.   You do not need a Pap test if your uterus was removed (hysterectomy) and you have not had cancer.  You should be tested each year for STDs (sexually transmitted diseases) if you're at risk.   Have a mammogram every 1 to 2 years.  Have a colonoscopy at age 45, or have a yearly FIT test (stool test). These exams screen for colon cancer.    Have a cholesterol test every 5 years, or more often if advised.  Have a diabetes test (fasting glucose) every three years. If you are at risk for diabetes, you should have this test more often.   If you are at risk for osteoporosis (brittle bone disease), think about having a bone density scan (DEXA).    Shots: Get a flu shot each year. Get a tetanus shot every 10 years.    Nutrition:   Eat at least 5 servings of fruits and vegetables each day.  Eat whole-grain bread, whole-wheat pasta and brown rice instead of white grains and rice.  Get adequate Calcium and Vitamin D.     Lifestyle  Exercise at least 150 minutes a week (30 minutes a day, 5 days a week). This will help you control your weight and prevent disease.  Limit alcohol  to one drink per day.  No smoking.   Wear sunscreen to prevent skin cancer.   See your dentist every six months for an exam and cleaning.  See your eye doctor every 1 to 2 years.

## 2024-01-02 NOTE — PROGRESS NOTES
SUBJECTIVE:   Charmaine is a 58 year old, presenting for the following:  Physical        2024     9:34 AM   Additional Questions   Roomed by Michelle OLSON       Healthy Habits:     Getting at least 3 servings of Calcium per day:  Yes    Bi-annual eye exam:  Yes    Dental care twice a year:  Yes    Sleep apnea or symptoms of sleep apnea:  None    Diet:  Other    Frequency of exercise:  6-7 days/week    Duration of exercise:  30-45 minutes    Taking medications regularly:  Yes    Medication side effects:  None    Additional concerns today:  Yes      Today's PHQ-2 Score:       2024     9:13 AM   PHQ-2 (  [employer])   Q1: Little interest or pleasure in doing things 0   Q2: Feeling down, depressed or hopeless 0   PHQ-2 Score 0   Q1: Little interest or pleasure in doing things Not at all   Q2: Feeling down, depressed or hopeless Not at all   PHQ-2 Score 0     Have you ever done Advance Care Planning? (For example, a Health Directive, POLST, or a discussion with a medical provider or your loved ones about your wishes): No, advance care planning information given to patient to review.  Patient declined advance care planning discussion at this time.    Social History     Tobacco Use    Smoking status: Never    Smokeless tobacco: Not on file   Substance Use Topics    Alcohol use: Never             2024     9:13 AM   Alcohol Use   Prescreen: >3 drinks/day or >7 drinks/week? Not Applicable          No data to display              Reviewed orders with patient.  Reviewed health maintenance and updated orders accordingly - Yes  Lab work is in process  Labs reviewed in EPIC    Breast Cancer Screenin/2/2024     9:19 AM   Breast CA Risk Assessment (FHS-7)   Do you have a family history of breast, colon, or ovarian cancer? No / Unknown       click delete button to remove this line now  Mammogram Screening: Recommended mammography every 1-2 years with patient discussion and risk factor consideration  Pertinent  mammograms are reviewed under the imaging tab.    History of abnormal Pap smear: Status post benign hysterectomy. Health Maintenance and Surgical History updated.     Reviewed and updated as needed this visit by clinical staff   Tobacco  Allergies  Meds  Problems  Med Hx  Surg Hx  Fam Hx          Reviewed and updated as needed this visit by Provider   Tobacco  Allergies  Meds  Problems  Med Hx  Surg Hx  Fam Hx         Past Medical History:   Diagnosis Date    Seasonal allergies       Past Surgical History:   Procedure Laterality Date    GYN SURGERY      c-sections,hysterectomy    HERNIA REPAIR       OB History   No obstetric history on file.       Review of Systems   Constitutional:  Negative for chills and fever.   HENT:  Negative for congestion, ear pain, hearing loss and sore throat.    Eyes:  Positive for pain. Negative for visual disturbance.   Respiratory:  Negative for cough and shortness of breath.    Cardiovascular:  Positive for peripheral edema. Negative for chest pain and palpitations.   Gastrointestinal:  Negative for abdominal pain, constipation, diarrhea, heartburn, hematochezia and nausea.   Breasts:  Negative for tenderness, breast mass and discharge.   Genitourinary:  Negative for dysuria, frequency, genital sores, hematuria, pelvic pain, urgency, vaginal bleeding and vaginal discharge.   Musculoskeletal:  Positive for arthralgias and joint swelling. Negative for myalgias.   Skin:  Positive for rash.   Neurological:  Negative for dizziness, weakness, headaches and paresthesias.   Psychiatric/Behavioral:  Negative for mood changes. The patient is not nervous/anxious.      CONSTITUTIONAL: NEGATIVE for fever, chills, change in weight  INTEGUMENTARY/SKIN: NEGATIVE for worrisome rashes, moles or lesions  EYES: NEGATIVE for vision changes or irritation  ENT: NEGATIVE for ear, mouth and throat problems  RESP: NEGATIVE for significant cough or SOB  BREAST: NEGATIVE for masses, tenderness or  "discharge  CV: NEGATIVE for chest pain, palpitations or peripheral edema  GI: NEGATIVE for nausea, abdominal pain, heartburn, or change in bowel habits  : NEGATIVE for unusual urinary or vaginal symptoms. No vaginal bleeding.  MUSCULOSKELETAL: NEGATIVE for significant arthralgias or myalgia  NEURO: NEGATIVE for weakness, dizziness or paresthesias  PSYCHIATRIC: NEGATIVE for changes in mood or affect      OBJECTIVE:   /80 (BP Location: Left arm, Patient Position: Sitting, Cuff Size: Adult Regular)   Pulse 55   Temp 97.8  F (36.6  C) (Tympanic)   Resp 16   Ht 1.651 m (5' 5\")   Wt 73.2 kg (161 lb 6.4 oz)   SpO2 99%   BMI 26.86 kg/m    Physical Exam  GENERAL APPEARANCE: healthy, alert and no distress  EYES: Eyes POSITIVE for bilateral orbital swellings mildly seen which she is appearing as allergic conjunctivitis, small hordeolum externum also seen on the left upper eyelid which appears resolving., PERRL and conjunctivae and sclerae normal  HENT: ear canals and TM's normal, nose and mouth without ulcers or lesions, oropharynx clear and oral mucous membranes moist  NECK: no adenopathy, no asymmetry, masses, or scars and thyroid normal to palpation  RESP: lungs clear to auscultation - no rales, rhonchi or wheezes  BREAST: Deferred , Mammogram  CV: regular rate and rhythm, normal S1 S2, no S3 or S4, no murmur, click or rub, no peripheral edema and peripheral pulses strong  ABDOMEN: soft, nontender, no hepatosplenomegaly, no masses and bowel sounds normal  MS: no musculoskeletal defects are noted and gait is age appropriate without ataxia  SKIN: no suspicious lesions or rashes  NEURO: Normal strength and tone, sensory exam grossly normal, mentation intact and speech normal  PSYCH: mentation appears normal and affect normal/bright    Diagnostic Test Results:  Labs reviewed in Epic    ASSESSMENT/PLAN:       Assessment and Plan  1. Routine general medical examination at a health care facility  Last seen patient " in March 2021 for telephone visit with ongoing undifferentiated connective tissue disease diagnosis at that time, she is here for annual physical exam.  She did have right trochanteric bursitis at that time.  Also has concerns of incisional hernia status post hysterectomy with the hernia mesh and the rectus abdominis in 2015, with intestinal adhesions and was hesitant for Colonoscopy plans at that time.    - HIV Antigen Antibody Combo; Future  - Hepatitis C Screen Reflex to HCV RNA Quant and Genotype; Future  - Lipid panel reflex to direct LDL Non-fasting; Future  - MA SCREENING DIGITAL BILAT - Future  (s+30); Future  - albuterol (PROAIR HFA/PROVENTIL HFA/VENTOLIN HFA) 108 (90 Base) MCG/ACT inhaler; INHALE 1 PUFF BY MOUTH EVERY 6 HOURS AS NEEDED  Dispense: 18 g; Refill: 1  - Adult Allergy/Asthma  Referral; Future  - HIV Antigen Antibody Combo  - Hepatitis C Screen Reflex to HCV RNA Quant and Genotype  - Lipid panel reflex to direct LDL Non-fasting    2. Connective tissue disease, undifferentiated (H24)  Ongoing problem, patient has been following rheumatology but declining the treatment offered to her worried about the side effects.  - Reviewed recent at recent rheumatology consultants scanned documents on patientLast office visit with them on August 9, 2023 recommending Plaquenil usage but patient declined and requesting for lab works as recommended by them to be done by PCP.  She is here for annual physical and will consider on the required lab work.  CBC, DMARD panel, ESR, NELSON,CRP, urinalysis which are ordered already done by the recent rheumatology as I discussed with the patient on the lab results.  -Reassured the patient again that the disease complications are much more having risks than the side effects of medication which she is worried about.  Patient is going to really think about this and let us know on her decision if she decides to start on medication she will be following rheumatology  again.  -But she would like to make sure the clinical tissue disease does not affect her lungs, kidneys, eyes which I have discussed.  Okay to follow-up with me in 6 months for recheck on this.  All the risks and complications understood  - Comprehensive metabolic panel (BMP + Alb, Alk Phos, ALT, AST, Total. Bili, TP); Future  - CBC with platelets; Future  - Comprehensive metabolic panel (BMP + Alb, Alk Phos, ALT, AST, Total. Bili, TP)  - CBC with platelets    3. Mild intermittent asthma without complication  - albuterol (PROAIR HFA/PROVENTIL HFA/VENTOLIN HFA) 108 (90 Base) MCG/ACT inhaler; INHALE 1 PUFF BY MOUTH EVERY 6 HOURS AS NEEDED  Dispense: 18 g; Refill: 1    4. Seasonal allergies  - cetirizine (ZYRTEC) 10 MG tablet; Take 1 tablet (10 mg) by mouth daily  Dispense: 30 tablet; Refill: 1    5. Multiple food allergies  Ongoing problem which patient states that she will have sudden appearance of hives and also bee sting site with mild swelling and local reaction.  Okay to take Benadryl, I do not suspect she needs EpiPen at this time.  Will consider allergy referral at this time and follow recommendations depending on the severity which not active at this time.  Okay to continue cetirizine and as needed Benadryl for any reactions.  Patient understood and agreed with the plan.  - Adult Allergy/Asthma  Referral; Future  - cetirizine (ZYRTEC) 10 MG tablet; Take 1 tablet (10 mg) by mouth daily  Dispense: 30 tablet; Refill: 1    6. Allergic conjunctivitis, bilateral  7. Hordeolum externum of left upper eyelid  New problem of bilateral orbital swellings mildly seen which she is appearing as allergic conjunctivitis, small hordeolum externum also seen on the left upper eyelid which appears resolving.  Discussed on continuing the conservative management on the external hordeolum and update me if no improvement for possible need of ophthalmology referral at that time.  For now we will consider azelastine eyedrops  for improvement.  Patient understood and agreed with the plan.  - azelastine (OPTIVAR) 0.05 % ophthalmic solution; Place 1 drop into both eyes 2 times daily 2 weeks  Dispense: 6 mL; Refill: 0    8. Visit for screening mammogram  - MA SCREENING DIGITAL BILAT - Future  (s+30); Future    9. Screening for HIV (human immunodeficiency virus)  - HIV Antigen Antibody Combo; Future  - HIV Antigen Antibody Combo    10. Need for hepatitis C screening test  - Hepatitis C Screen Reflex to HCV RNA Quant and Genotype; Future  - Hepatitis C Screen Reflex to HCV RNA Quant and Genotype    11. Encounter for screening for lipid disorder  - Lipid panel reflex to direct LDL Non-fasting; Future  - Lipid panel reflex to direct LDL Non-fasting         Please note that this note consists of symbols derived from keyboarding, dictation and/or voice recognition software. As a result, there may be errors in the script that have gone undetected. Please consider this when interpreting information found in this chart.    Patient Instructions   As discussed , please do fasting labs placed  As opted will check your baseline labs once in 6 months given the connective tissue disorder on your list.   Started on eye drops for your conjunctivitis.   Continue Zyrtec.  Placed referral to Allergy specialist    Please think over to approach Rheumatology once you decide to take the treatment plan recommended by them.     ==========================    Preventive Health Recommendations  Female Ages 50 - 64    Yearly exam: See your health care provider every year in order to  Review health changes.   Discuss preventive care.    Review your medicines if your doctor has prescribed any.    Get a Pap test every three years (unless you have an abnormal result and your provider advises testing more often).  If you get Pap tests with HPV test, you only need to test every 5 years, unless you have an abnormal result.   You do not need a Pap test if your uterus was removed  (hysterectomy) and you have not had cancer.  You should be tested each year for STDs (sexually transmitted diseases) if you're at risk.   Have a mammogram every 1 to 2 years.  Have a colonoscopy at age 45, or have a yearly FIT test (stool test). These exams screen for colon cancer.    Have a cholesterol test every 5 years, or more often if advised.  Have a diabetes test (fasting glucose) every three years. If you are at risk for diabetes, you should have this test more often.   If you are at risk for osteoporosis (brittle bone disease), think about having a bone density scan (DEXA).    Shots: Get a flu shot each year. Get a tetanus shot every 10 years.    Nutrition:   Eat at least 5 servings of fruits and vegetables each day.  Eat whole-grain bread, whole-wheat pasta and brown rice instead of white grains and rice.  Get adequate Calcium and Vitamin D.     Lifestyle  Exercise at least 150 minutes a week (30 minutes a day, 5 days a week). This will help you control your weight and prevent disease.  Limit alcohol to one drink per day.  No smoking.   Wear sunscreen to prevent skin cancer.   See your dentist every six months for an exam and cleaning.  See your eye doctor every 1 to 2 years.    Return in about 6 months (around 7/2/2024), or if symptoms worsen or fail to improve, for video visit, Follow up of last visit for untreated connective tissue disease and recheck of lab work.    Crystal Lewis MD  [facility]        Patient has been advised of split billing requirements and indicates understanding: Yes      COUNSELING:  Reviewed preventive health counseling, as reflected in patient instructions  Special attention given to:        Regular exercise       Healthy diet/nutrition       Vision screening       Hearing screening       Immunizations  Declined: Covid-19 and Influenza due to Concerns about side effects/safety             Osteoporosis prevention/bone health      BMI:   Estimated  "body mass index is 26.86 kg/m  as calculated from the following:    Height as of this encounter: 1.651 m (5' 5\").    Weight as of this encounter: 73.2 kg (161 lb 6.4 oz).   Weight management plan: Discussed healthy diet and exercise guidelines      She reports that she has never smoked. She does not have any smokeless tobacco history on file.          Crystal Lewis MD  [facility]  "

## 2024-01-03 NOTE — RESULT ENCOUNTER NOTE
Your Cholesterol is borderline high. Given your age and no cardiac risk factors , recommend dietery management of avoiding high fat foods and red meat . Life style measures on weight reduction recommended.     All your OTHER labs normal, you may see some highlighted which do not have Clinical significance.    Please let me know if you have any questions.  Crystal Lewis MD on 1/3/2024   [facility]

## 2024-02-01 DIAGNOSIS — J30.2 SEASONAL ALLERGIES: ICD-10-CM

## 2024-02-01 DIAGNOSIS — Z91.018 MULTIPLE FOOD ALLERGIES: ICD-10-CM

## 2024-02-01 RX ORDER — CETIRIZINE HYDROCHLORIDE 10 MG/1
10 TABLET ORAL DAILY
Qty: 30 TABLET | Refills: 1 | OUTPATIENT
Start: 2024-02-01

## 2024-04-23 DIAGNOSIS — Z00.00 ROUTINE GENERAL MEDICAL EXAMINATION AT A HEALTH CARE FACILITY: ICD-10-CM

## 2024-04-23 DIAGNOSIS — J45.20 MILD INTERMITTENT ASTHMA WITHOUT COMPLICATION: ICD-10-CM

## 2024-04-23 RX ORDER — ALBUTEROL SULFATE 90 UG/1
AEROSOL, METERED RESPIRATORY (INHALATION)
Qty: 18 G | Refills: 0 | Status: SHIPPED | OUTPATIENT
Start: 2024-04-23

## 2024-05-10 ENCOUNTER — OFFICE VISIT (OUTPATIENT)
Dept: ALLERGY | Facility: CLINIC | Age: 59
End: 2024-05-10
Payer: COMMERCIAL

## 2024-05-10 VITALS
OXYGEN SATURATION: 98 % | WEIGHT: 160.3 LBS | SYSTOLIC BLOOD PRESSURE: 122 MMHG | DIASTOLIC BLOOD PRESSURE: 75 MMHG | RESPIRATION RATE: 18 BRPM | BODY MASS INDEX: 26.68 KG/M2 | HEART RATE: 59 BPM

## 2024-05-10 DIAGNOSIS — T63.441A BEE STING REACTION, ACCIDENTAL OR UNINTENTIONAL, INITIAL ENCOUNTER: Primary | ICD-10-CM

## 2024-05-10 DIAGNOSIS — R06.2 WHEEZING: ICD-10-CM

## 2024-05-10 DIAGNOSIS — T78.1XXA ADVERSE FOOD REACTION, INITIAL ENCOUNTER: ICD-10-CM

## 2024-05-10 DIAGNOSIS — J30.1 SEASONAL ALLERGIC RHINITIS DUE TO POLLEN: ICD-10-CM

## 2024-05-10 PROCEDURE — 95004 PERQ TESTS W/ALRGNC XTRCS: CPT | Performed by: INTERNAL MEDICINE

## 2024-05-10 PROCEDURE — 99203 OFFICE O/P NEW LOW 30 MIN: CPT | Mod: 25 | Performed by: INTERNAL MEDICINE

## 2024-05-10 NOTE — PROGRESS NOTES
Charmaine Webb was seen in the Allergy Clinic at [facility].    Charmaine Webb is a 59 year old female being seen today at the request of Dr. Crystal Lewis  in consultation for food allergies.    She has a variety of concerns today.  She has a history of seasonal allergies for which Zyrtec is providing benefit.  She would like to have repeat allergy testing.  She uses Optivar for eye symptoms occasionally which provides benefit.  She also sees rheumatology and has been diagnosed with a mixed connective tissue disorder.  Plaquenil was recommended but she is not ready to start medications for this condition.  She has had problems with sunscreen in the past but does find zinc oxide to work well without causing reaction.    She has numerous food concerns such as beef collagen, guar gum, kiwi, salmon, aleshia, Asian dressings, as well as blue cheese.  Symptoms with some of these foods can include wheezing, itchy throat, hand or face swelling for which Benadryl provides some benefit.    She also has a history of asthma for which albuterol is used approximate 1 time per week with good results.    In addition she has bee sting reactions with local large reactions with some blistering and also systemic symptoms with wheezing and facial swelling.    In addition she has concerns about mast cell disease.      Past Medical History:   Diagnosis Date    Seasonal allergies      No family history on file.  Past Surgical History:   Procedure Laterality Date    GYN SURGERY      c-sections,hysterectomy    HERNIA REPAIR         ENVIRONMENTAL HISTORY:   Pets inside the house include 1dog .  Do you smoke cigarettes or other recreational drugs? No There is/are 0 smokers living in the house. The house does not have a damp basement.     SOCIAL HISTORY:   Charmaine is employed as . She lives with her family.      Review of Systems      Current Outpatient Medications:     albuterol (PROAIR  HFA/PROVENTIL HFA/VENTOLIN HFA) 108 (90 Base) MCG/ACT inhaler, INHALE 1 PUFF BY MOUTH EVERY 6 HOURS AS NEEDED, Disp: 18 g, Rfl: 0    azelastine (OPTIVAR) 0.05 % ophthalmic solution, Place 1 drop into both eyes 2 times daily 2 weeks, Disp: 6 mL, Rfl: 0    cetirizine (ZYRTEC) 10 MG tablet, Take 1 tablet (10 mg) by mouth daily, Disp: 30 tablet, Rfl: 1    PREMARIN 0.625 MG tablet, Take 0.625 mg by mouth daily, Disp: , Rfl:   Allergies   Allergen Reactions    Codeine      States has night terrors    Other [No Clinical Screening - See Comments]      Ayana         EXAM:   /75 (BP Location: Left arm, Patient Position: Sitting, Cuff Size: Adult Regular)   Pulse 59   Resp 18   Wt 72.7 kg (160 lb 4.8 oz)   SpO2 98%   BMI 26.68 kg/m      Physical Exam    Constitutional:       General: She is not in acute distress.     Appearance: Normal appearance. She is not ill-appearing.   HENT:      Head: Normocephalic and atraumatic.      Nose: Nose normal. No congestion or rhinorrhea.      Mouth/Throat:      Mouth: Mucous membranes are moist.      Pharynx: Oropharynx is clear. No posterior oropharyngeal erythema.   Eyes:      General:         Right eye: No discharge.         Left eye: No discharge.   Cardiovascular:      Rate and Rhythm: Normal rate and regular rhythm.      Heart sounds: Normal heart sounds.   Pulmonary:      Effort: Pulmonary effort is normal.      Breath sounds: Normal breath sounds. No wheezing or rhonchi.   Skin:     General: Skin is warm.      Findings: No erythema or rash.   Neurological:      General: No focal deficit present.      Mental Status: She is alert. Mental status is at baseline.   Psychiatric:         Mood and Affect: Mood normal.         Behavior: Behavior normal.      WORKUP: Skin testing was positive to trees and weeds.    ENVIRONMENTAL PERCUTANEOUS SKIN TESTING: ADULT      5/10/2024     9:00 AM   [facility]   Consent Y   Ordering Physician Dr. Celis   Interpreting Physician   Vimal   Testing Technician Kaci MURPHY RN   Location Back   Time start: 09:37   Time End: 09:52   Positive Control: Histatrol*ALK 1 mg/ml 3/5   Negative Control: 50% Glycerin 0   Cat Hair*ALK (10,000 BAU/ml) 0   AP Dog Hair/Dander (1:100 w/v) 0   Dust Mite p. 30,000 AU/ml 0   Dust Mite f. (30,000 AU/ml) 0   Greg (W/F in millimeters) 0   Dago Grass (100,000 BAU/mL) 0   Red Cedar (W/F in millimeters) 0   Maple/[place] (W/F in millimeters) 0   Hackberry (W/F in millimeters) 0   [place] (W/F in millimeters) 5/20   [place] *ALK (W/F in millimeters) 0   American Elm (W/F in millimeters) 0   [place] (W/F in millimeters) 0   Black [place] (W/F in millimeters) 5/20   Birch Mix (W/F in millimeters) 3/7   [place] (W/F in millimeters) 0   Oak (W/F in millimeters) 0   Cocklebur (W/F in millimeters) 0   [place] (W/F in millimeters) 0   White [place] (W/F in millimeters) 5/25   Careless (W/F in millimeters) 0   Nettle (W/F in millimeters) 0   English Plantain (W/F in millimeters) 0   Kochia (W/F in millimeters) 0   Lamb's Quarter (W/F in millimeters) 0   Marshelder (W/F in millimeters) 0   Ragweed Mix* ALK (W/F in millimeters) 10/40   Russian Thistle (W/F in millimeters) 0   Sagebrush/Mugwort (W/F in millimeters) 0   Sheep Sorrel (W/F in millimeters) 0   Feather Mix* ALK (W/F in millimeters) 0   Penicillium Mix (1:10 w/v) 0   Curvularia spicifera (1:10 w/v) 0   Epicoccum (1:10 w/v) 0   Aspergillus fumigatus (1:10 w/v): 0   Alternaria tenius (1:10 w/v) 0   H. Cladosporium (1:10 w/v) 0   Phoma herbarum (1:10 w/v) 0       FOOD ALLERGEN PERCUTANEOUS SKIN TESTING      5/10/2024    10:00 AM   [employer]    [place]  1:20 (W/F in millimeters) 0   Sesame Seed  1:20 (W/F in millimeters) 0         ASSESSMENT/PLAN:  Charmaine Webb is a 59 year old female seen today for a variety of concerns including seasonal allergies, beings bee sting reactions, asthma which is stable, and questions about mast cell disease.  Skin testing  was positive to trees and weeds.  We did discuss oral allergy syndrome which could contribute to some mild symptoms with certain foods.  Will order blood test for additional food concerns as well as a tryptase.  Also order blood test for venom IgE levels.    Will plan to follow-up depending on the results of the blood tests.  If the blood test for the venoms are positive we will have to discuss options such as venom immunotherapy and also the EpiPen.  If the tryptase is positive we will have to consider treatment options.    Follow-up to be determined.      Thank you for allowing me to participate in the care of Charmaine Webb.      I spent 35 minutes on the date of the encounter doing chart review, history and exam, documentation and further coordination as noted above exclusive of separately reported interpretations    Alvin Celis MD  Allergy/Immunology  [facility]

## 2024-05-10 NOTE — PROGRESS NOTES
Per provider verbal order, placed Adult Environmental Panel, salmon and sesame seed scratch test.  Verbal consent was obtained by MD prior to procedure.  Once panels were placed, patient was monitored for 15 minutes in clinic.  Provider read test after 15 minutes.  Pt tolerated procedure well.  All questions and concerns were addressed at office visit.     Kaci Fontaine RN

## 2024-05-10 NOTE — PATIENT INSTRUCTIONS
Allergy Staff Appt Hours Shot Hours Location       Physician   Alvin Celis MD      Support Staff   COLIN Ramírez RN, MA Emily J., MA      Mondays Tuesdays Thursdays and Fridays:      Ana Laura 7-5      Wednesdays         Close                Mondays, Tuesdays and Fridays:  7:20 - 3:40              [facility]  [address]  [place] [address]  Allergy appointment  line: [phone]    Pulmonary Function Scheduling:  [place]: [phone]           Questions about cost of your care  For questions about your cost of your visit, procedure, lab or imaging contact: [place] [phone] or visit:  [email or web address]    Prescription Assistance  If you need assistance with your prescriptions (cost, coverage, etc) please contact: [facility] [phone]    Important Scheduling Information  All visits for food challenges, medication/drug allergy testing, and drug challenges MUST be scheduled through the allergy clinic nurse. Please contact them via [facility] or by calling the clinic at [phone] and asking to speak with an allergy nurse. They will provide additional information and instructions for the appointment. Discontinue oral antihistamines 7 days prior to the appointment. Discontinue nasal and ocular antihistamines 1 day prior to the appointment.    Appointments for skin testing: Appointment will last approximately 45 minutes.  Please call the appointment line for your clinic to schedule.  Discontinue oral antihistamines 7 days prior to the appointment.  Discontinue nasal and ocular antihistamines 1 days prior to appointment.    Thank you for trusting us with your care. Please feel free to contact us with any questions or concerns you may have.

## 2024-05-10 NOTE — LETTER
5/10/2024         RE: Charmaine Webb  [address]  [place] [address]        Dear Colleague,    Thank you for referring your patient, Charmaine Webb, to the [facility] [place]. Please see a copy of my visit note below.    Charmaine Webb was seen in the Allergy Clinic at [facility].    Charmaine Webb is a 59 year old female being seen today at the request of Dr. Crystal Lewis  in consultation for food allergies.    She has a variety of concerns today.  She has a history of seasonal allergies for which Zyrtec is providing benefit.  She would like to have repeat allergy testing.  She uses Optivar for eye symptoms occasionally which provides benefit.  She also sees rheumatology and has been diagnosed with a mixed connective tissue disorder.  Plaquenil was recommended but she is not ready to start medications for this condition.  She has had problems with sunscreen in the past but does find zinc oxide to work well without causing reaction.    She has numerous food concerns such as beef collagen, guar gum, kiwi, salmon, aleshia, Asian dressings, as well as blue cheese.  Symptoms with some of these foods can include wheezing, itchy throat, hand or face swelling for which Benadryl provides some benefit.    She also has a history of asthma for which albuterol is used approximate 1 time per week with good results.    In addition she has bee sting reactions with local large reactions with some blistering and also systemic symptoms with wheezing and facial swelling.    In addition she has concerns about mast cell disease.      Past Medical History:   Diagnosis Date     Seasonal allergies      No family history on file.  Past Surgical History:   Procedure Laterality Date     GYN SURGERY      c-sections,hysterectomy     HERNIA REPAIR         ENVIRONMENTAL HISTORY:   Pets inside the house include 1dog .  Do you smoke cigarettes or other recreational  drugs? No There is/are 0 smokers living in the house. The house does not have a damp basement.     SOCIAL HISTORY:   Charmaine is employed as . She lives with her family.      Review of Systems      Current Outpatient Medications:      albuterol (PROAIR HFA/PROVENTIL HFA/VENTOLIN HFA) 108 (90 Base) MCG/ACT inhaler, INHALE 1 PUFF BY MOUTH EVERY 6 HOURS AS NEEDED, Disp: 18 g, Rfl: 0     azelastine (OPTIVAR) 0.05 % ophthalmic solution, Place 1 drop into both eyes 2 times daily 2 weeks, Disp: 6 mL, Rfl: 0     cetirizine (ZYRTEC) 10 MG tablet, Take 1 tablet (10 mg) by mouth daily, Disp: 30 tablet, Rfl: 1     PREMARIN 0.625 MG tablet, Take 0.625 mg by mouth daily, Disp: , Rfl:   Allergies   Allergen Reactions     Codeine      States has night terrors     Other [No Clinical Screening - See Comments]      Ayana         EXAM:   /75 (BP Location: Left arm, Patient Position: Sitting, Cuff Size: Adult Regular)   Pulse 59   Resp 18   Wt 72.7 kg (160 lb 4.8 oz)   SpO2 98%   BMI 26.68 kg/m      Physical Exam    Constitutional:       General: She is not in acute distress.     Appearance: Normal appearance. She is not ill-appearing.   HENT:      Head: Normocephalic and atraumatic.      Nose: Nose normal. No congestion or rhinorrhea.      Mouth/Throat:      Mouth: Mucous membranes are moist.      Pharynx: Oropharynx is clear. No posterior oropharyngeal erythema.   Eyes:      General:         Right eye: No discharge.         Left eye: No discharge.   Cardiovascular:      Rate and Rhythm: Normal rate and regular rhythm.      Heart sounds: Normal heart sounds.   Pulmonary:      Effort: Pulmonary effort is normal.      Breath sounds: Normal breath sounds. No wheezing or rhonchi.   Skin:     General: Skin is warm.      Findings: No erythema or rash.   Neurological:      General: No focal deficit present.      Mental Status: She is alert. Mental status is at baseline.   Psychiatric:         Mood and Affect: Mood  normal.         Behavior: Behavior normal.      WORKUP: Skin testing was positive to trees and weeds.    ENVIRONMENTAL PERCUTANEOUS SKIN TESTING: ADULT      5/10/2024     9:00 AM   [facility]   Consent Y   Ordering Physician Dr. Celis   Interpreting Physician Dr. Celis   Testing Technician Kaci MURPHY, COLIN   Location Back   Time start: 09:37   Time End: 09:52   Positive Control: Histatrol*ALK 1 mg/ml 3/5   Negative Control: 50% Glycerin 0   Cat Hair*ALK (10,000 BAU/ml) 0   AP Dog Hair/Dander (1:100 w/v) 0   Dust Mite p. 30,000 AU/ml 0   Dust Mite f. (30,000 AU/ml) 0   Greg (W/F in millimeters) 0   Dago Grass (100,000 BAU/mL) 0   Red Cedar (W/F in millimeters) 0   Maple/[place] (W/F in millimeters) 0   [place]berry (W/F in millimeters) 0   [place] (W/F in millimeters) 5/20   [place] *ALK (W/F in millimeters) 0   American Elm (W/F in millimeters) 0   [place] (W/F in millimeters) 0   Black [place] (W/F in millimeters) 5/20   Birch Mix (W/F in millimeters) 3/7   [place] (W/F in millimeters) 0   Oak (W/F in millimeters) 0   [place] (W/F in millimeters) 0   [place] (W/F in millimeters) 0   White [place] (W/F in millimeters) 5/25   Careless (W/F in millimeters) 0   Nettle (W/F in millimeters) 0   English Plantain (W/F in millimeters) 0   Kochia (W/F in millimeters) 0   Lamb's Quarter (W/F in millimeters) 0   Marshelder (W/F in millimeters) 0   Ragweed Mix* ALK (W/F in millimeters) 10/40   Russian Thistle (W/F in millimeters) 0   Sagebrush/Mugwort (W/F in millimeters) 0   Sheep Sorrel (W/F in millimeters) 0   Feather Mix* ALK (W/F in millimeters) 0   Penicillium Mix (1:10 w/v) 0   Curvularia spicifera (1:10 w/v) 0   Epicoccum (1:10 w/v) 0   Aspergillus fumigatus (1:10 w/v): 0   Alternaria tenius (1:10 w/v) 0   H. Cladosporium (1:10 w/v) 0   Phoma herbarum (1:10 w/v) 0       FOOD ALLERGEN PERCUTANEOUS SKIN TESTING      5/10/2024    10:00 AM   [employer]    [place]  1:20 (W/F in millimeters) 0   Sesame Seed   1:20 (W/F in millimeters) 0         ASSESSMENT/PLAN:  Charmaine Webb is a 59 year old female seen today for a variety of concerns including seasonal allergies, beings bee sting reactions, asthma which is stable, and questions about mast cell disease.  Skin testing was positive to trees and weeds.  We did discuss oral allergy syndrome which could contribute to some mild symptoms with certain foods.  Will order blood test for additional food concerns as well as a tryptase.  Also order blood test for venom IgE levels.    Will plan to follow-up depending on the results of the blood tests.  If the blood test for the venoms are positive we will have to discuss options such as venom immunotherapy and also the EpiPen.  If the tryptase is positive we will have to consider treatment options.    Follow-up to be determined.      Thank you for allowing me to participate in the care of Charmaine Webb.      I spent 35 minutes on the date of the encounter doing chart review, history and exam, documentation and further coordination as noted above exclusive of separately reported interpretations    Alvin Celis MD  Allergy/Immunology  [facility]       Per provider verbal order, placed Adult Environmental Panel, salmon and sesame seed scratch test.  Verbal consent was obtained by MD prior to procedure.  Once panels were placed, patient was monitored for 15 minutes in clinic.  Provider read test after 15 minutes.  Pt tolerated procedure well.  All questions and concerns were addressed at office visit.     Kaci Fontaine RN      Again, thank you for allowing me to participate in the care of your patient.        Sincerely,        Alvin Celis MD

## 2024-05-13 ENCOUNTER — LAB (OUTPATIENT)
Dept: LAB | Facility: CLINIC | Age: 59
End: 2024-05-13
Payer: COMMERCIAL

## 2024-05-13 DIAGNOSIS — R06.2 WHEEZING: ICD-10-CM

## 2024-05-13 DIAGNOSIS — T63.441A BEE STING REACTION, ACCIDENTAL OR UNINTENTIONAL, INITIAL ENCOUNTER: ICD-10-CM

## 2024-05-13 DIAGNOSIS — T78.1XXA ADVERSE FOOD REACTION, INITIAL ENCOUNTER: ICD-10-CM

## 2024-05-13 LAB
Lab: NORMAL
PERFORMING LABORATORY: NORMAL
SPECIMEN STATUS: NORMAL
TEST NAME: NORMAL

## 2024-05-13 PROCEDURE — 83520 IMMUNOASSAY QUANT NOS NONAB: CPT

## 2024-05-13 PROCEDURE — 36415 COLL VENOUS BLD VENIPUNCTURE: CPT

## 2024-05-13 PROCEDURE — 86003 ALLG SPEC IGE CRUDE XTRC EA: CPT | Mod: 59

## 2024-05-13 PROCEDURE — 99000 SPECIMEN HANDLING OFFICE-LAB: CPT

## 2024-05-13 PROCEDURE — 86003 ALLG SPEC IGE CRUDE XTRC EA: CPT | Mod: 90

## 2024-05-13 PROCEDURE — 82785 ASSAY OF IGE: CPT

## 2024-05-14 LAB
DEPRECATED MISC ALLERGEN IGE RAST QL: NORMAL
HONEY BEE IGE QN: <0.1 KU(A)/L
IGE SERPL-ACNC: 24 KU/L (ref 0–114)
KIWIFRUIT IGE QN: <0.1 KU/L
PAPER WASP IGE QN: <0.1 KU(A)/L
SESAME SEED IGE QN: <0.1 KU(A)/L
TRYPTASE SERPL-MCNC: 5.3 UG/L
WHITEFACED HORNET IGE QN: <0.1 KU(A)/L
YELLOW HORNET IGE QN: <0.1 KU(A)/L
YELLOW JACKET IGE QN: 0.24 KU(A)/L

## 2024-05-18 LAB
DEPRECATED MISC ALLERGEN IGE RAST QL: NORMAL
MISCELLANEOUS TEST 1 (ARUP): NORMAL

## 2024-05-22 ENCOUNTER — HOSPITAL ENCOUNTER (OUTPATIENT)
Dept: MAMMOGRAPHY | Facility: CLINIC | Age: 59
Discharge: HOME | End: 2024-05-22
Attending: INTERNAL MEDICINE | Admitting: INTERNAL MEDICINE
Payer: COMMERCIAL

## 2024-05-22 DIAGNOSIS — Z00.00 ROUTINE GENERAL MEDICAL EXAMINATION AT A HEALTH CARE FACILITY: ICD-10-CM

## 2024-05-22 DIAGNOSIS — Z12.31 VISIT FOR SCREENING MAMMOGRAM: ICD-10-CM

## 2024-05-22 PROCEDURE — 77063 BREAST TOMOSYNTHESIS BI: CPT

## 2024-12-03 ENCOUNTER — PATIENT OUTREACH (OUTPATIENT)
Dept: CARE COORDINATION | Facility: CLINIC | Age: 59
End: 2024-12-03
Payer: COMMERCIAL

## 2024-12-17 ENCOUNTER — PATIENT OUTREACH (OUTPATIENT)
Dept: CARE COORDINATION | Facility: CLINIC | Age: 59
End: 2024-12-17
Payer: COMMERCIAL

## 2025-02-23 ENCOUNTER — HEALTH MAINTENANCE LETTER (OUTPATIENT)
Age: 60
End: 2025-02-23

## 2025-04-24 ENCOUNTER — TRANSFERRED RECORDS (OUTPATIENT)
Dept: HEALTH INFORMATION MANAGEMENT | Facility: CLINIC | Age: 60
End: 2025-04-24
Payer: COMMERCIAL

## 2025-07-01 ENCOUNTER — PATIENT OUTREACH (OUTPATIENT)
Dept: CARE COORDINATION | Facility: CLINIC | Age: 60
End: 2025-07-01
Payer: COMMERCIAL